# Patient Record
Sex: MALE | Race: WHITE | NOT HISPANIC OR LATINO | Employment: OTHER | URBAN - NONMETROPOLITAN AREA
[De-identification: names, ages, dates, MRNs, and addresses within clinical notes are randomized per-mention and may not be internally consistent; named-entity substitution may affect disease eponyms.]

---

## 2018-01-31 ENCOUNTER — HOSPITAL ENCOUNTER (OUTPATIENT)
Dept: GENERAL RADIOLOGY | Facility: HOSPITAL | Age: 58
Discharge: HOME | End: 2018-01-31
Admitting: NURSE PRACTITIONER

## 2018-01-31 ENCOUNTER — TRANSCRIBE ORDERS (OUTPATIENT)
Dept: ADMINISTRATIVE | Facility: HOSPITAL | Age: 58
End: 2018-01-31

## 2018-01-31 DIAGNOSIS — J40 BRONCHITIS: Primary | ICD-10-CM

## 2018-01-31 PROCEDURE — 71046 X-RAY EXAM CHEST 2 VIEWS: CPT

## 2019-02-26 ENCOUNTER — TRANSCRIBE ORDERS (OUTPATIENT)
Dept: ADMINISTRATIVE | Facility: HOSPITAL | Age: 59
End: 2019-02-26

## 2019-02-26 ENCOUNTER — HOSPITAL ENCOUNTER (OUTPATIENT)
Dept: GENERAL RADIOLOGY | Facility: HOSPITAL | Age: 59
Discharge: HOME | End: 2019-02-26

## 2019-02-26 ENCOUNTER — HOSPITAL ENCOUNTER (OUTPATIENT)
Dept: ULTRASOUND IMAGING | Facility: HOSPITAL | Age: 59
Discharge: HOME | End: 2019-02-26
Admitting: NURSE PRACTITIONER

## 2019-02-26 DIAGNOSIS — R20.2 TINGLING OF SKIN: ICD-10-CM

## 2019-02-26 DIAGNOSIS — R14.0 ABDOMINAL DISTENTION: ICD-10-CM

## 2019-02-26 DIAGNOSIS — R20.2 PARESTHESIA: ICD-10-CM

## 2019-02-26 DIAGNOSIS — R42 DIZZINESS AND GIDDINESS: ICD-10-CM

## 2019-02-26 DIAGNOSIS — R06.00 DYSPNEA, UNSPECIFIED TYPE: ICD-10-CM

## 2019-02-26 DIAGNOSIS — M79.605 LEFT LEG PAIN: ICD-10-CM

## 2019-02-26 DIAGNOSIS — R11.2 NAUSEA AND VOMITING, INTRACTABILITY OF VOMITING NOT SPECIFIED, UNSPECIFIED VOMITING TYPE: ICD-10-CM

## 2019-02-26 DIAGNOSIS — R42 DIZZINESS AND GIDDINESS: Primary | ICD-10-CM

## 2019-02-26 DIAGNOSIS — R20.2 PARESTHESIA: Primary | ICD-10-CM

## 2019-02-26 PROCEDURE — 71046 X-RAY EXAM CHEST 2 VIEWS: CPT

## 2019-02-26 PROCEDURE — 93971 EXTREMITY STUDY: CPT

## 2019-02-26 PROCEDURE — 74018 RADEX ABDOMEN 1 VIEW: CPT

## 2019-02-27 ENCOUNTER — HOSPITAL ENCOUNTER (OUTPATIENT)
Dept: CT IMAGING | Facility: HOSPITAL | Age: 59
Discharge: HOME | End: 2019-02-27
Admitting: NURSE PRACTITIONER

## 2019-02-27 ENCOUNTER — APPOINTMENT (OUTPATIENT)
Dept: CT IMAGING | Facility: HOSPITAL | Age: 59
End: 2019-02-27

## 2019-02-27 DIAGNOSIS — R20.2 TINGLING OF SKIN: ICD-10-CM

## 2019-02-27 DIAGNOSIS — R42 DIZZINESS AND GIDDINESS: ICD-10-CM

## 2019-02-27 PROCEDURE — 70450 CT HEAD/BRAIN W/O DYE: CPT

## 2019-03-13 ENCOUNTER — OFFICE VISIT (OUTPATIENT)
Dept: GASTROENTEROLOGY | Facility: CLINIC | Age: 59
End: 2019-03-13

## 2019-03-13 ENCOUNTER — LAB (OUTPATIENT)
Dept: LAB | Facility: HOSPITAL | Age: 59
End: 2019-03-13

## 2019-03-13 VITALS
SYSTOLIC BLOOD PRESSURE: 106 MMHG | HEART RATE: 78 BPM | WEIGHT: 208 LBS | TEMPERATURE: 96.2 F | HEIGHT: 71 IN | DIASTOLIC BLOOD PRESSURE: 74 MMHG | BODY MASS INDEX: 29.12 KG/M2 | OXYGEN SATURATION: 99 %

## 2019-03-13 DIAGNOSIS — B18.2 CHRONIC HEPATITIS C WITHOUT HEPATIC COMA (HCC): ICD-10-CM

## 2019-03-13 DIAGNOSIS — R79.89 ELEVATED LFTS: ICD-10-CM

## 2019-03-13 DIAGNOSIS — B18.2 CHRONIC HEPATITIS C WITHOUT HEPATIC COMA (HCC): Primary | ICD-10-CM

## 2019-03-13 LAB
ALBUMIN SERPL-MCNC: 4.4 G/DL (ref 3.5–5)
ALBUMIN/GLOB SERPL: 1.5 G/DL (ref 1.1–2.5)
ALP SERPL-CCNC: 67 U/L (ref 24–120)
ALT SERPL W P-5'-P-CCNC: 88 U/L (ref 0–54)
ANION GAP SERPL CALCULATED.3IONS-SCNC: 11 MMOL/L (ref 4–13)
AST SERPL-CCNC: 58 U/L (ref 7–45)
BASOPHILS # BLD AUTO: 0.02 10*3/MM3 (ref 0–0.2)
BASOPHILS NFR BLD AUTO: 0.4 % (ref 0–2)
BILIRUB SERPL-MCNC: 1.2 MG/DL (ref 0.1–1)
BUN BLD-MCNC: 14 MG/DL (ref 5–21)
BUN/CREAT SERPL: 13.2 (ref 7–25)
CALCIUM SPEC-SCNC: 9.2 MG/DL (ref 8.4–10.4)
CHLORIDE SERPL-SCNC: 104 MMOL/L (ref 98–110)
CO2 SERPL-SCNC: 27 MMOL/L (ref 24–31)
CREAT BLD-MCNC: 1.06 MG/DL (ref 0.5–1.4)
DEPRECATED RDW RBC AUTO: 42.2 FL (ref 40–54)
EOSINOPHIL # BLD AUTO: 0.06 10*3/MM3 (ref 0–0.7)
EOSINOPHIL NFR BLD AUTO: 1.2 % (ref 0–4)
ERYTHROCYTE [DISTWIDTH] IN BLOOD BY AUTOMATED COUNT: 13.1 % (ref 12–15)
ETHANOL UR QL: <0.01 %
GFR SERPL CREATININE-BSD FRML MDRD: 72 ML/MIN/1.73
GLOBULIN UR ELPH-MCNC: 3 GM/DL
GLUCOSE BLD-MCNC: 109 MG/DL (ref 70–100)
HBV CORE IGM SERPL QL IA: NEGATIVE
HBV SURFACE AB SER QL: <5
HBV SURFACE AB SER RIA-ACNC: ABNORMAL
HBV SURFACE AG SERPL QL IA: NEGATIVE
HCT VFR BLD AUTO: 44 % (ref 40–52)
HGB BLD-MCNC: 16.3 G/DL (ref 14–18)
HIV1+2 AB SER QL: NEGATIVE
IMM GRANULOCYTES # BLD AUTO: 0.01 10*3/MM3 (ref 0–0.05)
IMM GRANULOCYTES NFR BLD AUTO: 0.2 % (ref 0–5)
INR PPP: 1.07 (ref 0.91–1.09)
LYMPHOCYTES # BLD AUTO: 1.97 10*3/MM3 (ref 0.72–4.86)
LYMPHOCYTES NFR BLD AUTO: 39.6 % (ref 15–45)
MCH RBC QN AUTO: 32.8 PG (ref 28–32)
MCHC RBC AUTO-ENTMCNC: 37 G/DL (ref 33–36)
MCV RBC AUTO: 88.5 FL (ref 82–95)
MONOCYTES # BLD AUTO: 0.28 10*3/MM3 (ref 0.19–1.3)
MONOCYTES NFR BLD AUTO: 5.6 % (ref 4–12)
NEUTROPHILS # BLD AUTO: 2.63 10*3/MM3 (ref 1.87–8.4)
NEUTROPHILS NFR BLD AUTO: 53 % (ref 39–78)
NRBC BLD AUTO-RTO: 0 /100 WBC (ref 0–0)
PLATELET # BLD AUTO: 160 10*3/MM3 (ref 130–400)
PMV BLD AUTO: 10.6 FL (ref 6–12)
POTASSIUM BLD-SCNC: 3.9 MMOL/L (ref 3.5–5.3)
PROT SERPL-MCNC: 7.4 G/DL (ref 6.3–8.7)
PROTHROMBIN TIME: 14.2 SECONDS (ref 11.9–14.6)
RBC # BLD AUTO: 4.97 10*6/MM3 (ref 4.8–5.9)
SODIUM BLD-SCNC: 142 MMOL/L (ref 135–145)
WBC NRBC COR # BLD: 4.97 10*3/MM3 (ref 4.8–10.8)

## 2019-03-13 PROCEDURE — 85025 COMPLETE CBC W/AUTO DIFF WBC: CPT | Performed by: NURSE PRACTITIONER

## 2019-03-13 PROCEDURE — 87902 NFCT AGT GNTYP ALYS HEP C: CPT | Performed by: NURSE PRACTITIONER

## 2019-03-13 PROCEDURE — 85610 PROTHROMBIN TIME: CPT | Performed by: NURSE PRACTITIONER

## 2019-03-13 PROCEDURE — 99203 OFFICE O/P NEW LOW 30 MIN: CPT | Performed by: NURSE PRACTITIONER

## 2019-03-13 PROCEDURE — 86705 HEP B CORE ANTIBODY IGM: CPT | Performed by: NURSE PRACTITIONER

## 2019-03-13 PROCEDURE — G0432 EIA HIV-1/HIV-2 SCREEN: HCPCS | Performed by: NURSE PRACTITIONER

## 2019-03-13 PROCEDURE — 80053 COMPREHEN METABOLIC PANEL: CPT | Performed by: NURSE PRACTITIONER

## 2019-03-13 PROCEDURE — 87522 HEPATITIS C REVRS TRNSCRPJ: CPT | Performed by: NURSE PRACTITIONER

## 2019-03-13 PROCEDURE — 87340 HEPATITIS B SURFACE AG IA: CPT | Performed by: NURSE PRACTITIONER

## 2019-03-13 PROCEDURE — 80307 DRUG TEST PRSMV CHEM ANLYZR: CPT | Performed by: NURSE PRACTITIONER

## 2019-03-13 PROCEDURE — 36415 COLL VENOUS BLD VENIPUNCTURE: CPT | Performed by: NURSE PRACTITIONER

## 2019-03-13 PROCEDURE — 86708 HEPATITIS A ANTIBODY: CPT | Performed by: NURSE PRACTITIONER

## 2019-03-13 PROCEDURE — 86706 HEP B SURFACE ANTIBODY: CPT | Performed by: NURSE PRACTITIONER

## 2019-03-13 NOTE — PROGRESS NOTES
[facility] GASTROENTEROLOGY - OFFICE NOTE    3/13/2019    Conor Elizondo   1960    Primary Physician: Lux Leon MD    Chief Complaint   Patient presents with   • Hepatitis C   elevated lfts         HISTORY OF PRESENT ILLNESS    Conor Elizondo is a 58 y.o. male presents  with hepatitis c and elevated lft's.         Hepatitis C: He attempted therapy 2002.  He did not tolerate therapy secondary to significant mental problems/depression.  At that time he was treated with interferon.  Last office visit regarding hepatitis C was August 2005 and at that time Dr. Neal did discuss going over [facility] to see what they thought about treatment.  To see if they would have an experimental protocol perhaps with a different medication other than interferon.  The patient was not interested at that time.  He also stated that he did not want to go through interferon therapy ever again.    He has tattoo. No history of blood transfusions. No history of iv drugs. No illicit drugs currently. He denies etoh. No jaundice. No abdominal swelling/distention. No lower extremity swelling.no n/v. No fever. No abdominal pain. No sign of active gi bleeding.        He does have elevated lft's and states has been elevated for several years.     Unsure if has had vaccines for hep A or B.     Last colonoscopy was 2016 at [facility] in [place], states that it was okay.  He was told to repeat colonoscopy in 10 years.  He has no personal history of colon polyps or colon cancer.  There was documentation in the past records that his father had colon cancer.  He is telling me now that his father did not have colon cancer.  There is no family history of colon cancer or colon polyps.    Past Medical History:   Diagnosis Date   • Arthritis    • Elevated liver enzymes    • GERD (gastroesophageal reflux disease)    • Gout    • Infectious viral hepatitis     C   • Renal stones    • Seizures (CMS/HCC)    • Venous stasis        Past  Surgical History:   Procedure Laterality Date   • CHOLECYSTECTOMY     • COLONOSCOPY  07/26/2004    normal   • FOREARM SURGERY     • IMPLANTABLE CONTACT LENS IMPLANTATION     • OTHER SURGICAL HISTORY      left scapula repair   • SHOULDER SURGERY     • VASECTOMY         Outpatient Medications Marked as Taking for the 3/13/19 encounter (Office Visit) with Carmen Walker APRN   Medication Sig Dispense Refill   • clotrimazole-betamethasone (LOTRISONE) 1-0.05 % cream Apply  topically to the appropriate area as directed As Needed.     • ibuprofen (ADVIL,MOTRIN) 200 MG tablet Take 200 mg by mouth Every 6 (Six) Hours As Needed for Mild Pain .         Allergies   Allergen Reactions   • Levaquin [Levofloxacin] Other (See Comments)     seizures       Social History     Socioeconomic History   • Marital status:      Spouse name: Not on file   • Number of children: Not on file   • Years of education: Not on file   • Highest education level: Not on file   Social Needs   • Financial resource strain: Not on file   • Food insecurity - worry: Not on file   • Food insecurity - inability: Not on file   • Transportation needs - medical: Not on file   • Transportation needs - non-medical: Not on file   Occupational History   • Not on file   Tobacco Use   • Smoking status: Never Smoker   • Smokeless tobacco: Never Used   Substance and Sexual Activity   • Alcohol use: No     Frequency: Never   • Drug use: No   • Sexual activity: Defer   Other Topics Concern   • Not on file   Social History Narrative   • Not on file       Family History   Problem Relation Age of Onset   • Colon cancer Neg Hx    • Colon polyps Neg Hx        Review of Systems   Constitutional: Negative for appetite change, chills, fatigue, fever and unexpected weight change.   HENT: Negative for sore throat and trouble swallowing.    Eyes: Negative for visual disturbance.   Respiratory: Negative for cough, chest tightness, shortness of breath and wheezing.   "  Cardiovascular: Negative for chest pain and palpitations.   Gastrointestinal: Negative for abdominal distention, abdominal pain, anal bleeding, blood in stool, constipation, diarrhea, nausea, rectal pain and vomiting.        As mentioned in hpi   Genitourinary: Negative for difficulty urinating and hematuria.   Musculoskeletal: Negative for arthralgias and back pain.   Skin: Negative for color change and rash.   Neurological: Negative for dizziness, seizures, syncope, light-headedness and headaches.   Hematological: Negative for adenopathy.   Psychiatric/Behavioral: Negative for confusion. The patient is not nervous/anxious.         Vitals:    03/13/19 1446   BP: 106/74   Pulse: 78   Temp: 96.2 °F (35.7 °C)   SpO2: 99%   Weight: 94.3 kg (208 lb)   Height: 180.3 cm (71\")      Body mass index is 29.01 kg/m².    Physical Exam   Constitutional: He appears well-developed and well-nourished. No distress.   HENT:   Head: Normocephalic and atraumatic.   Eyes: EOM are normal. No scleral icterus.   Neck: Neck supple. No JVD present.   Cardiovascular: Normal rate, regular rhythm and normal heart sounds.   Pulmonary/Chest: Effort normal and breath sounds normal.   Abdominal: Soft. Bowel sounds are normal. He exhibits no distension. There is no tenderness.   Musculoskeletal: Normal range of motion. He exhibits no deformity.   Neurological: He is alert.   Skin: Skin is warm and dry. No rash noted.   Psychiatric: He has a normal mood and affect. His behavior is normal.   Vitals reviewed.      No results found for this or any previous visit.        ASSESSMENT AND PLAN    Assessment/Plan     Conor was seen today for hepatitis c.    Diagnoses and all orders for this visit:    Chronic hepatitis C without hepatic coma (CMS/HCC)  -     Drug Screen (10), Serum; Future  -     Ethanol; Future  -     Hepatitis A Antibody, Total; Future  -     Hepatitis B Core Antibody, IgM; Future  -     Hepatitis B Surface Antibody; Future  -     " Hepatitis B Surface Antigen; Future  -     Hepatitis C Genotype  -     HIV-1 & HIV-2 Antibodies  -     HCV RT-PCR,Quant(Non-Graph)  -     US Liver; Future  -     US Elastography Parenchyma; Future  -     CBC & Differential  -     Comprehensive Metabolic Panel  -     Protime-INR    Elevated LFTs    Other orders  -     Cancel: CBC & Differential  -     Cancel: Comprehensive Metabolic Panel    In regards to chronic hepatitis C, plan to repeat labs as above.  We briefly discussed treatment.  We will discuss treatment more in depth once we have the results of the lab test.  I had a long discussion about hepatitis c.      We discussed the risks assoc such as hepatoma, cirrhosis and what that meant.  Discussed risk or premature death. Discussed tx options and estimated cure rates from 95-99%. Went thru all the side effects of meds (such as fatigue, HA, nausea, diarrhea, insomnia etc.) . We discussed the cost of treatment and what insurance companies may pay for and not.  The significance of existing fibrosis was discussed.  We discussed how to take and importance of compliance with meds, labs, and follow up visits as directed.     In regards to elevated LFTs, most likely secondary to hepatitis C.  I do not have any labs for review.  We will recheck labs today and if liver function test elevated then we will also obtain liver serologies to make sure no other reason for elevated liver function test other than hepatitis C.  Liver imaging to be obtained as well.         Body mass index is 29.01 kg/m².    Patient's Body mass index is 29.01 kg/m². BMI is above normal parameters. Recommendations include: no follow up , recommend weight loss. .       Return in about 6 weeks (around 4/24/2019).        PATRICIA Macdonald Dragon/transcription disclaimer:  Much of this encounter note is electronic transcription/translation of spoken language to printed text.  The electronic translation of spoken language may be erroneous, or  at times, nonsensical words or phrases may be inadvertently transcribed.  Although I have reviewed the note for such errors, some may still exist.

## 2019-03-14 ENCOUNTER — TELEPHONE (OUTPATIENT)
Dept: GASTROENTEROLOGY | Facility: CLINIC | Age: 59
End: 2019-03-14

## 2019-03-14 DIAGNOSIS — R79.89 ELEVATED LFTS: Primary | ICD-10-CM

## 2019-03-14 LAB — HAV AB SER QL IA: NEGATIVE

## 2019-03-14 NOTE — TELEPHONE ENCOUNTER
Pt verbalized understanding and will get labs in the morning and contact Dr Kate office. He will contact me if needed.

## 2019-03-14 NOTE — TELEPHONE ENCOUNTER
Please let patient know that his lft's are elevated , would recommend additional lab test to make sure no other reason for liver function test to be elevated other than hep c.   I will put order in for the additional labs , thank you     Also let him know that his labs indicate that he is not immune to hep a or b so I would recommend he contact his pcp to arrange getting vaccines ( recommend to be done before hep c treatment ).

## 2019-03-15 ENCOUNTER — LAB (OUTPATIENT)
Dept: LAB | Facility: HOSPITAL | Age: 59
End: 2019-03-15

## 2019-03-15 DIAGNOSIS — R79.89 ELEVATED LFTS: ICD-10-CM

## 2019-03-15 LAB
AMPHETAMINES SERPL QL SCN: NEGATIVE NG/ML
BARBITURATES SERPLBLD QL: NEGATIVE UG/ML
BENZODIAZ SERPL QL: NEGATIVE NG/ML
BZE BLD QL SCN: NEGATIVE NG/ML
FERRITIN SERPL-MCNC: 165 NG/ML (ref 17.9–464)
HCV RNA SERPL NAA+PROBE-ACNC: NORMAL IU/ML
HCV RNA SERPL NAA+PROBE-LOG IU: 5.8 LOG10 IU/ML
IRON 24H UR-MRATE: 78 MCG/DL (ref 42–180)
IRON SATN MFR SERPL: 22 % (ref 20–45)
METHADONE UR QL: NEGATIVE NG/ML
OPIATES SERPL QL SCN: NEGATIVE NG/ML
OXYCODONE SERPL-MCNC: NEGATIVE NG/ML
PCP SPEC-MCNC: NEGATIVE NG/ML
PROPOXYPH SPEC QL: NEGATIVE NG/ML
TEST INFORMATION: NORMAL
THC SERPLBLD CFM-MCNC: NEGATIVE NG/ML
TIBC SERPL-MCNC: 351 MCG/DL (ref 225–420)

## 2019-03-15 PROCEDURE — 83540 ASSAY OF IRON: CPT | Performed by: NURSE PRACTITIONER

## 2019-03-15 PROCEDURE — 82728 ASSAY OF FERRITIN: CPT | Performed by: NURSE PRACTITIONER

## 2019-03-15 PROCEDURE — 83516 IMMUNOASSAY NONANTIBODY: CPT | Performed by: NURSE PRACTITIONER

## 2019-03-15 PROCEDURE — 86038 ANTINUCLEAR ANTIBODIES: CPT | Performed by: NURSE PRACTITIONER

## 2019-03-15 PROCEDURE — 82103 ALPHA-1-ANTITRYPSIN TOTAL: CPT | Performed by: NURSE PRACTITIONER

## 2019-03-15 PROCEDURE — 36415 COLL VENOUS BLD VENIPUNCTURE: CPT

## 2019-03-15 PROCEDURE — 82390 ASSAY OF CERULOPLASMIN: CPT | Performed by: NURSE PRACTITIONER

## 2019-03-15 PROCEDURE — 83550 IRON BINDING TEST: CPT | Performed by: NURSE PRACTITIONER

## 2019-03-16 LAB
A1AT SERPL-MCNC: 129 MG/DL (ref 90–200)
ACTIN IGG SERPL-ACNC: 17 UNITS (ref 0–19)
CERULOPLASMIN SERPL-MCNC: 18.7 MG/DL (ref 16–31)
DEPRECATED MITOCHONDRIA M2 IGG SER-ACNC: <20 UNITS (ref 0–20)

## 2019-03-17 LAB
HCV GENTYP SERPL NAA+PROBE: NORMAL
Lab: NORMAL

## 2019-03-18 LAB
ANA SER QL IA: POSITIVE
ANA SPECKLED TITR SER: ABNORMAL {TITER}
Lab: ABNORMAL

## 2019-03-19 ENCOUNTER — TELEPHONE (OUTPATIENT)
Dept: GASTROENTEROLOGY | Facility: CLINIC | Age: 59
End: 2019-03-19

## 2019-03-20 ENCOUNTER — HOSPITAL ENCOUNTER (OUTPATIENT)
Dept: ULTRASOUND IMAGING | Facility: HOSPITAL | Age: 59
Discharge: HOME | End: 2019-03-20
Admitting: NURSE PRACTITIONER

## 2019-03-20 ENCOUNTER — HOSPITAL ENCOUNTER (OUTPATIENT)
Dept: ULTRASOUND IMAGING | Facility: HOSPITAL | Age: 59
Discharge: HOME | End: 2019-03-20

## 2019-03-20 DIAGNOSIS — B18.2 CHRONIC HEPATITIS C WITHOUT HEPATIC COMA (HCC): ICD-10-CM

## 2019-03-20 PROCEDURE — 76705 ECHO EXAM OF ABDOMEN: CPT

## 2019-03-20 PROCEDURE — 76981 USE PARENCHYMA: CPT

## 2019-03-22 ENCOUNTER — TELEPHONE (OUTPATIENT)
Dept: GASTROENTEROLOGY | Facility: CLINIC | Age: 59
End: 2019-03-22

## 2019-03-22 NOTE — TELEPHONE ENCOUNTER
Please let patient know that ultrasound shows fibrosis F 1 so that means there is minimal scarring of the liver noted.  Let him know we are going to be submitting info to specialty pharmacy to see which med his insurance will cover if any. He is to keep f/u appointment here 4/24/24 to regroup/discuss labs and treatment , thank you

## 2019-04-04 ENCOUNTER — TELEPHONE (OUTPATIENT)
Dept: GASTROENTEROLOGY | Facility: CLINIC | Age: 59
End: 2019-04-04

## 2019-04-04 NOTE — TELEPHONE ENCOUNTER
Jim, let us go ahead and submit all his information labs and office notes to [facility] with [facility] for hepatitis C treatment.  Thanks    Did talk to Mr. Elizondo today and he is ready to go ahead and have his information submitted for treatment for hepatitis C.  States that he has already started his hepatitis A/B vaccines.

## 2019-04-16 ENCOUNTER — TELEPHONE (OUTPATIENT)
Dept: GASTROENTEROLOGY | Facility: CLINIC | Age: 59
End: 2019-04-16

## 2019-04-16 NOTE — TELEPHONE ENCOUNTER
I called and spoke with him. He is in process of getting harvoni.  We discussed side effects of Harvoni.  We discussed not to take any prescription or over-the-counter medications or herbs without consulting with our office first.  Also told him not to take Amena's wort while on this medication.  Also discussed no alcohol.  He verbalizes understanding to call us with start date of Harvoni.

## 2019-04-19 ENCOUNTER — TELEPHONE (OUTPATIENT)
Dept: GASTROENTEROLOGY | Facility: CLINIC | Age: 59
End: 2019-04-19

## 2019-04-25 ENCOUNTER — TELEPHONE (OUTPATIENT)
Dept: GASTROENTEROLOGY | Facility: CLINIC | Age: 59
End: 2019-04-25

## 2019-04-25 DIAGNOSIS — B18.2 CHRONIC HEPATITIS C WITHOUT HEPATIC COMA (HCC): Primary | ICD-10-CM

## 2019-04-25 NOTE — TELEPHONE ENCOUNTER
Started harvoni 4-24-19, recommend recheck hcv rna quant the week of 6-24-19 with f/u appointment with me. I will put labs in , can you make sure he gets appointment made for week of 6-24-19. Thank you

## 2019-07-08 ENCOUNTER — LAB (OUTPATIENT)
Dept: LAB | Facility: HOSPITAL | Age: 59
End: 2019-07-08

## 2019-07-08 DIAGNOSIS — B18.2 CHRONIC HEPATITIS C WITHOUT HEPATIC COMA (HCC): ICD-10-CM

## 2019-07-08 PROCEDURE — 87522 HEPATITIS C REVRS TRNSCRPJ: CPT | Performed by: NURSE PRACTITIONER

## 2019-07-08 PROCEDURE — 36415 COLL VENOUS BLD VENIPUNCTURE: CPT

## 2019-07-09 ENCOUNTER — OFFICE VISIT (OUTPATIENT)
Dept: GASTROENTEROLOGY | Facility: CLINIC | Age: 59
End: 2019-07-09

## 2019-07-09 VITALS
BODY MASS INDEX: 28 KG/M2 | OXYGEN SATURATION: 99 % | WEIGHT: 200 LBS | HEIGHT: 71 IN | TEMPERATURE: 95.8 F | HEART RATE: 61 BPM | DIASTOLIC BLOOD PRESSURE: 78 MMHG | SYSTOLIC BLOOD PRESSURE: 120 MMHG

## 2019-07-09 DIAGNOSIS — R79.89 ELEVATED LIVER FUNCTION TESTS: ICD-10-CM

## 2019-07-09 DIAGNOSIS — B18.2 CHRONIC HEPATITIS C WITHOUT HEPATIC COMA (HCC): Primary | ICD-10-CM

## 2019-07-09 LAB
HCV RNA SERPL NAA+PROBE-ACNC: NORMAL IU/ML
TEST INFORMATION: NORMAL

## 2019-07-09 PROCEDURE — 99213 OFFICE O/P EST LOW 20 MIN: CPT | Performed by: NURSE PRACTITIONER

## 2019-07-09 NOTE — PROGRESS NOTES
[facility] GASTROENTEROLOGY - OFFICE NOTE    7/9/2019    Conor Elizondo   1960    Primary Physician: Lux Leon MD    Chief Complaint   Patient presents with   • Hepatitis C         HISTORY OF PRESENT ILLNESS:    Conor Elizondo is a 58 y.o. male presents for follow-up hepatitis C, genotype 1a.  He started Harvoni treatment on April 24, 2019.   Completed 8 weeks of Harvoni treatment.  He finished treatment around 6-14-19.   He had repeat HCV quantitative level last week and is still pending.  Had some lightheadedness while on treatment.  States that he feels really good right now.  He will be out of town again from July 18 to around August 9.  No n/v. No fatigue.  No abdominal pain.     He did get started on hepatitis A and B vaccines.  He is getting those at [facility] pharmacy.    Prior to hepatitis C treatment he had ultrasound elastography noting a METAVIR score of F1.  There were no liver lesions noted.   LFTs were elevated and liver serologies were all unremarkable.  ===================================================================    Ov  3-13-19   Hepatitis C: He attempted therapy 2002.  He did not tolerate therapy secondary to significant mental problems/depression.  At that time he was treated with interferon.  Last office visit regarding hepatitis C was August 2005 and at that time Dr. Neal did discuss [facility] to see what they thought about treatment.  To see if they would have an experimental protocol perhaps with a different medication other than interferon.  The patient was not interested at that time.  He also stated that he did not want to go through interferon therapy ever again.     He has tattoo. No history of blood transfusions. No history of iv drugs. No illicit drugs currently. He denies etoh. No jaundice. No abdominal swelling/distention. No lower extremity swelling.no n/v. No fever. No abdominal pain. No sign of active gi bleeding.          He does have  elevated lft's and states has been elevated for several years.      Unsure if has had vaccines for hep A or B.      Last colonoscopy was 2016 at [facility] in [place], states that it was okay.  He was told to repeat colonoscopy in 10 years.  He has no personal history of colon polyps or colon cancer.  There was documentation in the past records that his father had colon cancer.  He is telling me now that his father did not have colon cancer.  There is no family history of colon cancer or colon polyps.             Past Medical History:   Diagnosis Date   • Arthritis    • Elevated liver enzymes    • GERD (gastroesophageal reflux disease)    • Gout    • Infectious viral hepatitis     C   • Renal stones    • Seizures (CMS/HCC)    • Venous stasis        Past Surgical History:   Procedure Laterality Date   • CHOLECYSTECTOMY     • COLONOSCOPY  07/26/2004    normal   • FOREARM SURGERY     • IMPLANTABLE CONTACT LENS IMPLANTATION     • OTHER SURGICAL HISTORY      left scapula repair   • SHOULDER SURGERY     • VASECTOMY         Outpatient Medications Marked as Taking for the 7/9/19 encounter (Office Visit) with Carmen Walker APRN   Medication Sig Dispense Refill   • clotrimazole-betamethasone (LOTRISONE) 1-0.05 % cream Apply  topically to the appropriate area as directed As Needed.     • ibuprofen (ADVIL,MOTRIN) 200 MG tablet Take 200 mg by mouth Every 6 (Six) Hours As Needed for Mild Pain .         Allergies   Allergen Reactions   • Levaquin [Levofloxacin] Other (See Comments)     seizures       Social History     Socioeconomic History   • Marital status:      Spouse name: Not on file   • Number of children: Not on file   • Years of education: Not on file   • Highest education level: Not on file   Tobacco Use   • Smoking status: Never Smoker   • Smokeless tobacco: Never Used   Substance and Sexual Activity   • Alcohol use: No     Frequency: Never   • Drug use: No   • Sexual activity: Defer       Family History  "  Problem Relation Age of Onset   • Colon cancer Neg Hx    • Colon polyps Neg Hx        Review of Systems   Constitutional: Negative for chills, fever and unexpected weight change.   Respiratory: Negative for cough, shortness of breath and wheezing.    Cardiovascular: Negative for chest pain and palpitations.   Gastrointestinal: Negative for abdominal distention, abdominal pain, anal bleeding, blood in stool, constipation, diarrhea, nausea and vomiting.        Vitals:    07/09/19 1434   BP: 120/78   Pulse: 61   Temp: 95.8 °F (35.4 °C)   SpO2: 99%   Weight: 90.7 kg (200 lb)   Height: 180.3 cm (71\")      Body mass index is 27.89 kg/m².    Physical Exam   Constitutional: He appears well-developed and well-nourished. No distress.   Cardiovascular: Normal rate, regular rhythm and normal heart sounds.   Pulmonary/Chest: Effort normal and breath sounds normal.   Abdominal: Soft. Bowel sounds are normal. He exhibits no distension. There is no tenderness.   Musculoskeletal: He exhibits no edema.   Neurological: He is alert.   Skin: Skin is warm and dry.   Psychiatric: He has a normal mood and affect. His behavior is normal.   Vitals reviewed.      Results for orders placed or performed in visit on 03/15/19   Alpha - 1 - Antitrypsin   Result Value Ref Range    A-1 Antitrypsin 129 90 - 200 mg/dL   Anti-Smooth Muscle Antibody Titer   Result Value Ref Range    Smooth Muscle Ab 17 0 - 19 Units   Ferritin   Result Value Ref Range    Ferritin 165.00 17.90 - 464.00 ng/mL   Ceruloplasmin   Result Value Ref Range    Ceruloplasmin 18.7 16.0 - 31.0 mg/dL   Iron Profile   Result Value Ref Range    Iron 78 42 - 180 mcg/dL    TIBC 351 225 - 420 mcg/dL    Iron Saturation 22 20 - 45 %   Mitochondrial Antibodies, M2   Result Value Ref Range    Mitochondrial Ab <20.0 0.0 - 20.0 Units   Nuclear Antigen Antibody, IFA   Result Value Ref Range    LISY Positive (A)    ESTIVEN Staining Patterns   Result Value Ref Range    Speckled Pattern 1:160 (H)     " Note: (Reference) Comment            ASSESSMENT AND PLAN    Assessment/Plan     Conor was seen today for hepatitis c.    Diagnoses and all orders for this visit:    Chronic hepatitis C without hepatic coma (CMS/HCC)  Comments:  genotype 1 a   Orders:  -     Comprehensive Metabolic Panel; Future  -     HCV RT-PCR,Quant(Non-Graph); Future    Elevated liver function tests  -     Comprehensive Metabolic Panel; Future  -     HCV RT-PCR,Quant(Non-Graph); Future    He completed Harvoni treatment around June 14, 2019.  States that he feels really good.  He  has had no further fatigue.  He did get his lab work done yesterday which was an HCV quantitative level.  Results are pending.  We will repeat labs around August 14 which will be 2 months post treatment.  I did stress to him that is very important to have the lab test done.  He verbalized understanding.  I will contact him with results.  Follow-up here as needed unless treatment was unsuccessful then can make follow-up appointment to discuss options.           Body mass index is 27.89 kg/m².    Patient's Body mass index is 27.89 kg/m². BMI is above normal parameters. Recommendations include: No follow-up, recommend weight loss.         PATRICIA Macdonald    EMR Dragon/transcription disclaimer:  Much of this encounter note is electronic transcription/translation of spoken language to printed text.  The electronic translation of spoken language may be erroneous, or at times, nonsensical words or phrases may be inadvertently transcribed.  Although I have reviewed the note for such errors, some may still exist.

## 2019-07-12 ENCOUNTER — TELEPHONE (OUTPATIENT)
Dept: GASTROENTEROLOGY | Facility: CLINIC | Age: 59
End: 2019-07-12

## 2019-07-12 NOTE — TELEPHONE ENCOUNTER
Let him know HCV is not detected at this point. This is good news.  However as we discussed in the office he will need to have quantitative HCV rechecked again around August 14 which is more important. Thank you

## 2019-08-19 ENCOUNTER — LAB (OUTPATIENT)
Dept: LAB | Facility: HOSPITAL | Age: 59
End: 2019-08-19

## 2019-08-19 DIAGNOSIS — R79.89 ELEVATED LIVER FUNCTION TESTS: ICD-10-CM

## 2019-08-19 DIAGNOSIS — B18.2 CHRONIC HEPATITIS C WITHOUT HEPATIC COMA (HCC): ICD-10-CM

## 2019-08-19 LAB
ALBUMIN SERPL-MCNC: 4.2 G/DL (ref 3.5–5)
ALBUMIN/GLOB SERPL: 1.3 G/DL (ref 1.1–2.5)
ALP SERPL-CCNC: 66 U/L (ref 24–120)
ALT SERPL W P-5'-P-CCNC: 17 U/L (ref 0–54)
ANION GAP SERPL CALCULATED.3IONS-SCNC: 8 MMOL/L (ref 4–13)
AST SERPL-CCNC: 26 U/L (ref 7–45)
BILIRUB SERPL-MCNC: 1.4 MG/DL (ref 0.1–1)
BUN BLD-MCNC: 17 MG/DL (ref 5–21)
BUN/CREAT SERPL: 15.3 (ref 7–25)
CALCIUM SPEC-SCNC: 9.3 MG/DL (ref 8.4–10.4)
CHLORIDE SERPL-SCNC: 105 MMOL/L (ref 98–110)
CO2 SERPL-SCNC: 28 MMOL/L (ref 24–31)
CREAT BLD-MCNC: 1.11 MG/DL (ref 0.5–1.4)
GFR SERPL CREATININE-BSD FRML MDRD: 68 ML/MIN/1.73
GLOBULIN UR ELPH-MCNC: 3.3 GM/DL
GLUCOSE BLD-MCNC: 117 MG/DL (ref 70–100)
POTASSIUM BLD-SCNC: 3.9 MMOL/L (ref 3.5–5.3)
PROT SERPL-MCNC: 7.5 G/DL (ref 6.3–8.7)
SODIUM BLD-SCNC: 141 MMOL/L (ref 135–145)

## 2019-08-19 PROCEDURE — 36415 COLL VENOUS BLD VENIPUNCTURE: CPT

## 2019-08-19 PROCEDURE — 80053 COMPREHEN METABOLIC PANEL: CPT | Performed by: NURSE PRACTITIONER

## 2019-08-19 PROCEDURE — 87522 HEPATITIS C REVRS TRNSCRPJ: CPT | Performed by: NURSE PRACTITIONER

## 2019-08-20 LAB
HCV RNA SERPL NAA+PROBE-ACNC: NORMAL IU/ML
TEST INFORMATION: NORMAL

## 2019-08-21 ENCOUNTER — TELEPHONE (OUTPATIENT)
Dept: GASTROENTEROLOGY | Facility: CLINIC | Age: 59
End: 2019-08-21

## 2019-08-21 NOTE — TELEPHONE ENCOUNTER
Let him know that hcv not detected , so good news. He should have ov 1 mo from now for follow up. I will send message to girls up front. Thank you

## 2019-10-03 ENCOUNTER — LAB (OUTPATIENT)
Dept: LAB | Facility: HOSPITAL | Age: 59
End: 2019-10-03

## 2019-10-03 ENCOUNTER — OFFICE VISIT (OUTPATIENT)
Dept: GASTROENTEROLOGY | Facility: CLINIC | Age: 59
End: 2019-10-03

## 2019-10-03 VITALS
SYSTOLIC BLOOD PRESSURE: 112 MMHG | HEIGHT: 71 IN | HEART RATE: 65 BPM | WEIGHT: 204 LBS | DIASTOLIC BLOOD PRESSURE: 80 MMHG | OXYGEN SATURATION: 99 % | TEMPERATURE: 96.5 F | BODY MASS INDEX: 28.56 KG/M2

## 2019-10-03 DIAGNOSIS — R79.89 ABNORMAL LFTS: ICD-10-CM

## 2019-10-03 DIAGNOSIS — Z86.19 HEPATITIS C VIRUS INFECTION CURED AFTER ANTIVIRAL DRUG THERAPY: Primary | ICD-10-CM

## 2019-10-03 PROBLEM — B18.2 CHRONIC HEPATITIS C WITHOUT HEPATIC COMA (HCC): Status: RESOLVED | Noted: 2019-07-09 | Resolved: 2019-10-03

## 2019-10-03 PROCEDURE — 36415 COLL VENOUS BLD VENIPUNCTURE: CPT

## 2019-10-03 PROCEDURE — 99213 OFFICE O/P EST LOW 20 MIN: CPT | Performed by: INTERNAL MEDICINE

## 2019-10-03 PROCEDURE — 87522 HEPATITIS C REVRS TRNSCRPJ: CPT | Performed by: INTERNAL MEDICINE

## 2019-10-03 PROCEDURE — 82248 BILIRUBIN DIRECT: CPT | Performed by: INTERNAL MEDICINE

## 2019-10-03 PROCEDURE — 82247 BILIRUBIN TOTAL: CPT | Performed by: INTERNAL MEDICINE

## 2019-10-03 NOTE — PROGRESS NOTES
[facility] Gastroenterology    Chief Complaint   Patient presents with   • Elevated Hepatic Enzymes       Subjective     HPI    Conor Elizondo is a 58 y.o. male who presents with a chief complaint of abnormal LFTs.    He comes in for follow-up of an abnormal LFT.  His bilirubin is a little bit elevated at 1.4.  Previously is 1.2.  His transaminases have normalized.  They were elevated prior to start hepatitis C therapy.  He completed of 8 weeks of Harvoni on June 14.  He had a negative quantitative level in July and August.  See copies of prior H PI 's below.  He feels well.  He has no complaints.  He does tell me that he was fasting prior to his last labs when he had elevated bilirubin 1.4.  Dates he probably fasting for close to 1 day.  He is never been jaundiced never told he had Gilbert's disease.  He has no complaints.  He is happy that he he has had a positive response to Harvoni treatment.      ============================================================  July 9, 2019 HISTORY OF PRESENT ILLNESS:     Conor Elizondo is a 58 y.o. male presents for follow-up hepatitis C, genotype 1a.  He started Harvoni treatment on April 24, 2019.   Completed 8 weeks of Harvoni treatment.  He finished treatment around 6-14-19.   He had repeat HCV quantitative level last week and is still pending.  Had some lightheadedness while on treatment.  States that he feels really good right now.  He will be out of town again from July 18 to around August 9.  No n/v. No fatigue.  No abdominal pain.      He did get started on hepatitis A and B vaccines.  He is getting those at [facility] pharmacy.     Prior to hepatitis C treatment he had ultrasound elastography noting a METAVIR score of F1.  There were no liver lesions noted.   LFTs were elevated and liver serologies were all unremarkable.  ===================================================================     Ov  3-13-19   Hepatitis C: He attempted therapy 2002.  He did not tolerate  therapy secondary to significant mental problems/depression.  At that time he was treated with interferon.  Last office visit regarding hepatitis C was August 2005 and at that time Dr. Neal did discuss going over [facility] to see what they thought about treatment.  To see if they would have an experimental protocol perhaps with a different medication other than interferon.  The patient was not interested at that time.  He also stated that he did not want to go through interferon therapy ever again.     He has tattoo. No history of blood transfusions. No history of iv drugs. No illicit drugs currently. He denies etoh. No jaundice. No abdominal swelling/distention. No lower extremity swelling.no n/v. No fever. No abdominal pain. No sign of active gi bleeding.          He does have elevated lft's and states has been elevated for several years.      Unsure if has had vaccines for hep A or B.      Last colonoscopy was 2016 at [facility] in [place], states that it was okay.  He was told to repeat colonoscopy in 10 years.  He has no personal history of colon polyps or colon cancer.  There was documentation in the past records that his father had colon cancer.  He is telling me now that his father did not have colon cancer.  There is no family history of colon cancer or colon polyps.          Past Medical History:   Diagnosis Date   • Arthritis    • Elevated liver enzymes    • GERD (gastroesophageal reflux disease)    • Gout    • Infectious viral hepatitis     C   • Renal stones    • Seizures (CMS/HCC)    • Venous stasis        Past Surgical History:   Procedure Laterality Date   • CHOLECYSTECTOMY     • COLONOSCOPY  07/26/2004    normal   • FOREARM SURGERY     • IMPLANTABLE CONTACT LENS IMPLANTATION     • OTHER SURGICAL HISTORY      left scapula repair   • SHOULDER SURGERY     • VASECTOMY           Current Outpatient Medications:   •  clotrimazole-betamethasone (LOTRISONE) 1-0.05 % cream, Apply  topically to the  appropriate area as directed As Needed., Disp: , Rfl:   •  ibuprofen (ADVIL,MOTRIN) 200 MG tablet, Take 200 mg by mouth Every 6 (Six) Hours As Needed for Mild Pain ., Disp: , Rfl:     Allergies   Allergen Reactions   • Levaquin [Levofloxacin] Other (See Comments)     seizures       Social History     Socioeconomic History   • Marital status:      Spouse name: Not on file   • Number of children: Not on file   • Years of education: Not on file   • Highest education level: Not on file   Tobacco Use   • Smoking status: Never Smoker   • Smokeless tobacco: Never Used   Substance and Sexual Activity   • Alcohol use: No     Frequency: Never   • Drug use: No   • Sexual activity: Defer       Family History   Problem Relation Age of Onset   • Colon cancer Neg Hx    • Colon polyps Neg Hx        Review of Systems  General no fever chills or sweats weight stable  Gastrointestinal: Not present-abdominal pain, constipation, diarrhea, dysphagia, hematemesis, melena, odynophagia, nausea, vomiting, pyrosis, regurgitation, hematochezia,    Objective     Vitals:    10/03/19 1449   BP: 112/80   Pulse: 65   Temp: 96.5 °F (35.8 °C)   SpO2: 99%       Physical Exam  No acute distress. Vital signs as documented. Skin warm and dry and without overt rashes. EOMI, sclera anicteric.  Neck without JVD or masses. Lungs clear to auscultation bilaterally, no rales. Heart exam notable for regular rhythm, normal sounds and absence of loud murmurs, rubs or gallops. Abdomen is soft, nontender, non distended, normal bowel sounds and without evidence of organomegaly, masses, or abdominal aortic enlargement. Extremities nonedematous, no cyanosis. Neuro alert, moves extremities.        Assessment/Plan   Problem List Items Addressed This Visit        Other    Hepatitis C virus infection cured after antiviral drug therapy - Primary      Other Visit Diagnoses     Abnormal LFTs        Relevant Orders    Bilirubin, Total & Direct    HCV  RT-PCR,Quant(Non-Graph)            Regards to his hepatitis it appears that he has had a sustained response.  He had a quantitative level checked 2 months after therapy.  I do recommend we check it now to make sure he has a a sustained response.  If this is negative no further investigation is warranted.  He is in agreement.    He does tell me he has 1 round to go on his hepatitis AMB vaccinations and I encouraged him to finish out vaccination.    In regards to the elevated bilirubin, I suspect this is secondary to Rosa Maria bears.  His transaminases are normal.  It was elevated after fast.  I recommend we go ahead and check a total bilirubin and direct bilirubin today.  He states he did eat.  We will get labs on his way out.  If they are unremarkable then no further investigation.    If the labs come back unremarkable we will see him back in the office as needed.  He is in agreement with this approach.    Continue ongoing management by primary care provider and other specialists.     Patient's Body mass index is 28.45 kg/m². BMI is within normal parameters. No follow-up required..        EMR Dragon/transcription disclaimer:  Much of this encounter note is electronic transcription/translation of spoken language to printed text.  The electronic translation of spoken language may be erroneous, or at times, nonsensical words or phrases may be inadvertently transcribed.  Although I have reviewed the note for such errors, some may still exist.    Pete Neal MD  3:19 PM  10/03/19

## 2019-10-04 LAB
BILIRUB CONJ SERPL-MCNC: 0.2 MG/DL (ref 0.2–0.3)
BILIRUB INDIRECT SERPL-MCNC: 0.5 MG/DL
BILIRUB SERPL-MCNC: 0.7 MG/DL (ref 0.2–1.2)
HCV RNA SERPL NAA+PROBE-ACNC: NORMAL IU/ML
TEST INFORMATION: NORMAL

## 2019-10-25 ENCOUNTER — TRANSCRIBE ORDERS (OUTPATIENT)
Dept: ADMINISTRATIVE | Facility: HOSPITAL | Age: 59
End: 2019-10-25

## 2019-10-25 DIAGNOSIS — R07.9 CHEST PAIN, UNSPECIFIED TYPE: Primary | ICD-10-CM

## 2019-10-28 ENCOUNTER — TRANSCRIBE ORDERS (OUTPATIENT)
Dept: ADMINISTRATIVE | Facility: HOSPITAL | Age: 59
End: 2019-10-28

## 2019-10-28 DIAGNOSIS — R07.9 CHEST PAIN, UNSPECIFIED TYPE: Primary | ICD-10-CM

## 2019-10-29 ENCOUNTER — APPOINTMENT (OUTPATIENT)
Dept: CARDIOLOGY | Facility: HOSPITAL | Age: 59
End: 2019-10-29

## 2019-10-29 ENCOUNTER — HOSPITAL ENCOUNTER (OUTPATIENT)
Dept: CARDIOLOGY | Facility: HOSPITAL | Age: 59
Discharge: HOME | End: 2019-10-29
Admitting: INTERNAL MEDICINE

## 2019-10-29 VITALS
BODY MASS INDEX: 28.55 KG/M2 | HEART RATE: 56 BPM | HEIGHT: 71 IN | DIASTOLIC BLOOD PRESSURE: 49 MMHG | SYSTOLIC BLOOD PRESSURE: 155 MMHG | WEIGHT: 203.93 LBS

## 2019-10-29 PROCEDURE — 93018 CV STRESS TEST I&R ONLY: CPT | Performed by: INTERNAL MEDICINE

## 2019-10-29 PROCEDURE — 25010000002 PERFLUTREN 6.52 MG/ML SUSPENSION: Performed by: INTERNAL MEDICINE

## 2019-10-29 PROCEDURE — 93352 ADMIN ECG CONTRAST AGENT: CPT | Performed by: INTERNAL MEDICINE

## 2019-10-29 PROCEDURE — 93350 STRESS TTE ONLY: CPT | Performed by: INTERNAL MEDICINE

## 2019-10-29 PROCEDURE — 93350 STRESS TTE ONLY: CPT

## 2019-10-29 PROCEDURE — 93017 CV STRESS TEST TRACING ONLY: CPT

## 2019-10-29 RX ADMIN — PERFLUTREN 8.48 MG: 6.52 INJECTION, SUSPENSION INTRAVENOUS at 08:44

## 2019-10-30 LAB
BH CV STRESS BP STAGE 1: NORMAL
BH CV STRESS BP STAGE 2: NORMAL
BH CV STRESS DURATION MIN STAGE 1: 3
BH CV STRESS DURATION MIN STAGE 2: 3
BH CV STRESS DURATION MIN STAGE 3: 1
BH CV STRESS DURATION SEC STAGE 1: 0
BH CV STRESS DURATION SEC STAGE 2: 0
BH CV STRESS DURATION SEC STAGE 3: 25
BH CV STRESS GRADE STAGE 1: 10
BH CV STRESS GRADE STAGE 2: 12
BH CV STRESS GRADE STAGE 3: 14
BH CV STRESS HR STAGE 1: 96
BH CV STRESS HR STAGE 2: 129
BH CV STRESS HR STAGE 3: 148
BH CV STRESS METS STAGE 1: 5
BH CV STRESS METS STAGE 2: 7.5
BH CV STRESS METS STAGE 3: 10
BH CV STRESS PROTOCOL 1: NORMAL
BH CV STRESS RECOVERY BP: NORMAL MMHG
BH CV STRESS RECOVERY HR: 72 BPM
BH CV STRESS SPEED STAGE 1: 1.7
BH CV STRESS SPEED STAGE 2: 2.5
BH CV STRESS SPEED STAGE 3: 3.4
BH CV STRESS STAGE 1: 1
BH CV STRESS STAGE 2: 2
BH CV STRESS STAGE 3: 3
MAXIMAL PREDICTED HEART RATE: 162 BPM
PERCENT MAX PREDICTED HR: 91.36 %
STRESS BASELINE BP: NORMAL MMHG
STRESS BASELINE HR: 56 BPM
STRESS PERCENT HR: 107 %
STRESS POST ESTIMATED WORKLOAD: 10 METS
STRESS POST EXERCISE DUR MIN: 7 MIN
STRESS POST EXERCISE DUR SEC: 25 SEC
STRESS POST PEAK BP: NORMAL MMHG
STRESS POST PEAK HR: 148 BPM
STRESS TARGET HR: 138 BPM

## 2021-06-14 PROCEDURE — 87635 SARS-COV-2 COVID-19 AMP PRB: CPT | Performed by: NURSE PRACTITIONER

## 2022-05-03 ENCOUNTER — TELEPHONE (OUTPATIENT)
Dept: GASTROENTEROLOGY | Facility: CLINIC | Age: 62
End: 2022-05-03

## 2022-05-03 ENCOUNTER — PREP FOR SURGERY (OUTPATIENT)
Dept: OTHER | Facility: HOSPITAL | Age: 62
End: 2022-05-03

## 2022-05-03 DIAGNOSIS — Z80.0 FAMILY HISTORY OF COLON CANCER IN FATHER: ICD-10-CM

## 2022-05-03 DIAGNOSIS — Z12.11 ENCOUNTER FOR SCREENING COLONOSCOPY: Primary | ICD-10-CM

## 2022-05-03 NOTE — TELEPHONE ENCOUNTER
PT returned my call.  PT isn't having any issues, no blood thinners.    PT is scheduled for 5-19-22 for colon.    Prep?

## 2022-05-04 PROBLEM — Z12.11 ENCOUNTER FOR SCREENING COLONOSCOPY: Status: ACTIVE | Noted: 2022-05-04

## 2022-05-04 PROBLEM — Z80.0 FAMILY HISTORY OF COLON CANCER IN FATHER: Status: ACTIVE | Noted: 2022-05-04

## 2022-05-19 ENCOUNTER — ANESTHESIA (OUTPATIENT)
Dept: GASTROENTEROLOGY | Facility: HOSPITAL | Age: 62
End: 2022-05-19

## 2022-05-19 ENCOUNTER — HOSPITAL ENCOUNTER (OUTPATIENT)
Facility: HOSPITAL | Age: 62
Setting detail: HOSPITAL OUTPATIENT SURGERY
Discharge: HOME | End: 2022-05-19
Attending: INTERNAL MEDICINE | Admitting: INTERNAL MEDICINE

## 2022-05-19 ENCOUNTER — ANESTHESIA EVENT (OUTPATIENT)
Dept: GASTROENTEROLOGY | Facility: HOSPITAL | Age: 62
End: 2022-05-19

## 2022-05-19 ENCOUNTER — TELEPHONE (OUTPATIENT)
Dept: GASTROENTEROLOGY | Facility: CLINIC | Age: 62
End: 2022-05-19

## 2022-05-19 VITALS
OXYGEN SATURATION: 94 % | HEIGHT: 71 IN | RESPIRATION RATE: 18 BRPM | BODY MASS INDEX: 27.72 KG/M2 | HEART RATE: 69 BPM | DIASTOLIC BLOOD PRESSURE: 86 MMHG | SYSTOLIC BLOOD PRESSURE: 119 MMHG | WEIGHT: 198 LBS | TEMPERATURE: 97.3 F

## 2022-05-19 DIAGNOSIS — Z12.11 ENCOUNTER FOR SCREENING COLONOSCOPY: ICD-10-CM

## 2022-05-19 DIAGNOSIS — Z80.0 FAMILY HISTORY OF COLON CANCER IN FATHER: ICD-10-CM

## 2022-05-19 PROCEDURE — 45378 DIAGNOSTIC COLONOSCOPY: CPT | Performed by: INTERNAL MEDICINE

## 2022-05-19 PROCEDURE — 25010000002 PROPOFOL 10 MG/ML EMULSION: Performed by: NURSE ANESTHETIST, CERTIFIED REGISTERED

## 2022-05-19 RX ORDER — SODIUM CHLORIDE 0.9 % (FLUSH) 0.9 %
10 SYRINGE (ML) INJECTION AS NEEDED
Status: DISCONTINUED | OUTPATIENT
Start: 2022-05-19 | End: 2022-05-19 | Stop reason: HOSPADM

## 2022-05-19 RX ORDER — ONDANSETRON 2 MG/ML
4 INJECTION INTRAMUSCULAR; INTRAVENOUS ONCE AS NEEDED
Status: DISCONTINUED | OUTPATIENT
Start: 2022-05-19 | End: 2022-05-19 | Stop reason: HOSPADM

## 2022-05-19 RX ORDER — PROPOFOL 10 MG/ML
VIAL (ML) INTRAVENOUS AS NEEDED
Status: DISCONTINUED | OUTPATIENT
Start: 2022-05-19 | End: 2022-05-19 | Stop reason: SURG

## 2022-05-19 RX ORDER — LIDOCAINE HYDROCHLORIDE 20 MG/ML
INJECTION, SOLUTION EPIDURAL; INFILTRATION; INTRACAUDAL; PERINEURAL AS NEEDED
Status: DISCONTINUED | OUTPATIENT
Start: 2022-05-19 | End: 2022-05-19 | Stop reason: SURG

## 2022-05-19 RX ORDER — LIDOCAINE HYDROCHLORIDE 10 MG/ML
0.5 INJECTION, SOLUTION EPIDURAL; INFILTRATION; INTRACAUDAL; PERINEURAL ONCE AS NEEDED
Status: DISCONTINUED | OUTPATIENT
Start: 2022-05-19 | End: 2022-05-19 | Stop reason: HOSPADM

## 2022-05-19 RX ORDER — SODIUM CHLORIDE 9 MG/ML
500 INJECTION, SOLUTION INTRAVENOUS CONTINUOUS PRN
Status: DISCONTINUED | OUTPATIENT
Start: 2022-05-19 | End: 2022-05-19 | Stop reason: HOSPADM

## 2022-05-19 RX ADMIN — PROPOFOL 100 MG: 10 INJECTION, EMULSION INTRAVENOUS at 10:26

## 2022-05-19 RX ADMIN — PROPOFOL 100 MG: 10 INJECTION, EMULSION INTRAVENOUS at 10:30

## 2022-05-19 RX ADMIN — SODIUM CHLORIDE 500 ML: 9 INJECTION, SOLUTION INTRAVENOUS at 09:39

## 2022-05-19 RX ADMIN — LIDOCAINE HYDROCHLORIDE 50 MG: 20 INJECTION, SOLUTION EPIDURAL; INFILTRATION; INTRACAUDAL; PERINEURAL at 10:26

## 2022-05-19 RX ADMIN — PROPOFOL 50 MG: 10 INJECTION, EMULSION INTRAVENOUS at 10:34

## 2022-05-19 NOTE — ANESTHESIA PREPROCEDURE EVALUATION
Anesthesia Evaluation     Patient summary reviewed and Nursing notes reviewed   no history of anesthetic complications:  NPO Solid Status: > 8 hours  NPO Liquid Status: > 2 hours           Airway   Mallampati: I  TM distance: >3 FB  Neck ROM: full  No difficulty expected  Dental      Pulmonary    (-) not a smoker  Cardiovascular   Exercise tolerance: good (4-7 METS)    (-) hypertension, CAD      Neuro/Psych  (+) seizures (medication induced, once, years ago),    GI/Hepatic/Renal/Endo    (+)  GERD,  hepatitis (cleared virus with treatment) C, renal disease stones,     Musculoskeletal     Abdominal    Substance History      OB/GYN          Other   arthritis,                      Anesthesia Plan    ASA 2     MAC     intravenous induction     Anesthetic plan, all risks, benefits, and alternatives have been provided, discussed and informed consent has been obtained with: patient.        CODE STATUS:

## 2022-05-19 NOTE — H&P
[facility] Gastroenterology  Pre Procedure History & Physical    Chief Complaint:   Screening    Subjective     HPI:   Screening.  He is asymptomatic.  He presents for screening colonoscopy.  He originally thought his last colonoscopy was in 2016 in [place] but he states his family physician had records indicating his 2011.  He also had a colonoscopy in 2004.  He denies a family history of colon cancer    Previously had heard his father had colon cancer he states his father did not.  No family members with colon cancer.    Past Medical History:   Past Medical History:   Diagnosis Date   • Arthritis    • Elevated liver enzymes    • GERD (gastroesophageal reflux disease)    • Gout    • Infectious viral hepatitis     C   • Renal stones    • Seizures (HCC)    • Venous stasis        Past Surgical History:  Past Surgical History:   Procedure Laterality Date   • CHOLECYSTECTOMY     • COLONOSCOPY  07/26/2004    normal   • FOREARM SURGERY     • IMPLANTABLE CONTACT LENS IMPLANTATION     • OTHER SURGICAL HISTORY      left scapula repair   • SHOULDER SURGERY     • VASECTOMY         Family History:  Family History   Problem Relation Age of Onset   • Colon cancer Neg Hx    • Colon polyps Neg Hx    • Heart attack Neg Hx    • Heart disease Neg Hx    • Heart failure Neg Hx        Social History:   reports that he has never smoked. He has never used smokeless tobacco. He reports that he does not drink alcohol and does not use drugs.    Medications:   Prior to Admission medications    Medication Sig Start Date End Date Taking? Authorizing Provider   ibuprofen (ADVIL,MOTRIN) 200 MG tablet Take 200 mg by mouth Every 6 (Six) Hours As Needed for Mild Pain .   Yes ProviderRose MD   clotrimazole-betamethasone (LOTRISONE) 1-0.05 % cream Apply  topically to the appropriate area as directed As Needed.    ProviderRose MD       Allergies:  Levaquin [levofloxacin]    ROS:    General: Weight stable  Resp: No  "SOA  Cardiovascular: No CP    Objective     Blood pressure 104/73, pulse 80, temperature 97.3 °F (36.3 °C), temperature source Temporal, resp. rate 18, height 180.3 cm (71\"), weight 89.8 kg (198 lb), SpO2 97 %.    Physical Exam   Constitutional: Pt is oriented to person, place, and in no distress.   Cardiovascular: Normal rate, regular rhythm.    Pulmonary/Chest: Effort normal. No respiratory distress.   Abdominal: Non-distended.  Psychiatric: Mood, memory, affect and judgment appear normal.     Assessment & Plan     Diagnosis:  Screening    Anticipated Surgical Procedure:  Colonoscopy    The risks, benefits, and alternatives of this procedure have been discussed with the patient or the responsible party- the patient understands and agrees to proceed.    EMR Dragon/transcription disclaimer:  Much of this encounter note is electronic transcription/translation of spoken language to printed text.  The electronic translation of spoken language may be erroneous, or at times, nonsensical words or phrases may be inadvertently transcribed.  Although I have reviewed the note for such errors, some may still exist.  "

## 2022-05-19 NOTE — ANESTHESIA POSTPROCEDURE EVALUATION
"Patient: Conor Elizondo    Procedure Summary     Date: 05/19/22 Room / Location: [facility] ENDOSCOPY 4 / [facility] ENDOSCOPY    Anesthesia Start: 1021 Anesthesia Stop: 1046    Procedure: COLONOSCOPY (N/A ) Diagnosis:       Encounter for screening colonoscopy      Family history of colon cancer in father      (Encounter for screening colonoscopy [Z12.11])      (Family history of colon cancer in father [Z80.0])    Surgeons: Pete Neal MD Provider: Ender Jackson CRNA    Anesthesia Type: MAC ASA Status: 2          Anesthesia Type: MAC    Vitals  Vitals Value Taken Time   /86 05/19/22 1106   Temp     Pulse 73 05/19/22 1106   Resp 18 05/19/22 1105   SpO2 95 % 05/19/22 1106   Vitals shown include unvalidated device data.        Post Anesthesia Care and Evaluation    Patient location during evaluation: PHASE II  Patient participation: complete - patient participated  Level of consciousness: awake and alert  Pain management: adequate  Airway patency: patent  Anesthetic complications: No anesthetic complications    Cardiovascular status: acceptable  Respiratory status: acceptable  Hydration status: acceptable    Comments: Blood pressure 119/86, pulse 69, temperature 97.3 °F (36.3 °C), temperature source Temporal, resp. rate 18, height 180.3 cm (71\"), weight 89.8 kg (198 lb), SpO2 94 %.    Pt discharged from PACU based on nicko score >8      "

## 2022-11-10 ENCOUNTER — HOSPITAL ENCOUNTER (EMERGENCY)
Facility: HOSPITAL | Age: 62
Discharge: HOME | End: 2022-11-10
Attending: STUDENT IN AN ORGANIZED HEALTH CARE EDUCATION/TRAINING PROGRAM | Admitting: STUDENT IN AN ORGANIZED HEALTH CARE EDUCATION/TRAINING PROGRAM

## 2022-11-10 VITALS
SYSTOLIC BLOOD PRESSURE: 156 MMHG | HEIGHT: 71 IN | WEIGHT: 205 LBS | DIASTOLIC BLOOD PRESSURE: 100 MMHG | HEART RATE: 63 BPM | OXYGEN SATURATION: 95 % | BODY MASS INDEX: 28.7 KG/M2 | RESPIRATION RATE: 18 BRPM | TEMPERATURE: 98.5 F

## 2022-11-10 DIAGNOSIS — L02.413 ABSCESS OF SKIN OF RIGHT WRIST: Primary | ICD-10-CM

## 2022-11-10 DIAGNOSIS — L03.113 CELLULITIS OF RIGHT WRIST: ICD-10-CM

## 2022-11-10 PROCEDURE — 99283 EMERGENCY DEPT VISIT LOW MDM: CPT

## 2022-11-10 RX ORDER — LIDOCAINE HYDROCHLORIDE AND EPINEPHRINE BITARTRATE 20; .01 MG/ML; MG/ML
10 INJECTION, SOLUTION SUBCUTANEOUS ONCE
Status: COMPLETED | OUTPATIENT
Start: 2022-11-10 | End: 2022-11-10

## 2022-11-10 RX ORDER — CEPHALEXIN 500 MG/1
500 CAPSULE ORAL ONCE
Status: COMPLETED | OUTPATIENT
Start: 2022-11-10 | End: 2022-11-10

## 2022-11-10 RX ORDER — ACETAMINOPHEN 500 MG
1000 TABLET ORAL ONCE
Status: COMPLETED | OUTPATIENT
Start: 2022-11-10 | End: 2022-11-10

## 2022-11-10 RX ORDER — IBUPROFEN 400 MG/1
400 TABLET ORAL ONCE
Status: COMPLETED | OUTPATIENT
Start: 2022-11-10 | End: 2022-11-10

## 2022-11-10 RX ORDER — CEPHALEXIN 500 MG/1
500 CAPSULE ORAL 3 TIMES DAILY
Qty: 29 CAPSULE | Refills: 0 | Status: SHIPPED | OUTPATIENT
Start: 2022-11-10 | End: 2022-11-20

## 2022-11-10 RX ORDER — SULFAMETHOXAZOLE AND TRIMETHOPRIM 800; 160 MG/1; MG/1
1 TABLET ORAL 2 TIMES DAILY
Qty: 19 TABLET | Refills: 0 | Status: SHIPPED | OUTPATIENT
Start: 2022-11-10 | End: 2022-11-20

## 2022-11-10 RX ORDER — SULFAMETHOXAZOLE AND TRIMETHOPRIM 800; 160 MG/1; MG/1
1 TABLET ORAL ONCE
Status: COMPLETED | OUTPATIENT
Start: 2022-11-10 | End: 2022-11-10

## 2022-11-10 RX ADMIN — IBUPROFEN 400 MG: 400 TABLET, FILM COATED ORAL at 21:12

## 2022-11-10 RX ADMIN — LIDOCAINE HYDROCHLORIDE,EPINEPHRINE BITARTRATE 10 ML: 20; .01 INJECTION, SOLUTION INFILTRATION; PERINEURAL at 22:16

## 2022-11-10 RX ADMIN — SULFAMETHOXAZOLE AND TRIMETHOPRIM 1 TABLET: 800; 160 TABLET ORAL at 22:36

## 2022-11-10 RX ADMIN — CEPHALEXIN 500 MG: 500 CAPSULE ORAL at 22:36

## 2022-11-10 RX ADMIN — ACETAMINOPHEN 1000 MG: 500 TABLET ORAL at 21:12

## 2022-11-11 NOTE — ED PROCEDURE NOTE
Place of Service:[facility] EMERGENCY DEPARTMENT  Patient Name:Conor Elizondo  :1960    Procedure  Incision & Drainage    Date/Time: 11/10/2022 10:27 PM  Performed by: Ender Abreu MD  Authorized by: Ender Abreu MD     Consent:     Consent obtained:  Verbal    Consent given by:  Patient    Risks discussed:  Damage to other organs, bleeding, incomplete drainage, infection and pain    Alternatives discussed:  Referral  Universal protocol:     Imaging studies available: yes (bedside US)      Patient identity confirmed:  Verbally with patient  Location:     Type:  Abscess    Size:  2cm x 2cm    Location:  Upper extremity    Upper extremity location:  Wrist    Wrist location:  R wrist  Pre-procedure details:     Skin preparation:  Chlorhexidine  Anesthesia:     Anesthesia method:  Local infiltration    Local anesthetic:  Lidocaine 1% WITH epi  Procedure type:     Complexity:  Simple  Procedure details:     Ultrasound guidance: yes      Incision types:  Stab incision    Incision depth:  Subcutaneous    Wound management:  Probed and deloculated and irrigated with saline    Drainage:  Purulent and bloody    Drainage amount:  Scant    Wound treatment:  Wound left open    Packing materials:  None  Post-procedure details:     Procedure completion:  Tolerated well, no immediate complications              Ender Abreu MD  11/10/22 1460

## 2022-11-11 NOTE — ED PROVIDER NOTES
EMERGENCY DEPARTMENT HISTORY AND PHYSICAL EXAM    Patient Name: Conor Elizondo    Chief Complaint   Patient presents with   • Abscess       History of Presenting Illness:  Conor Elizondo is a 61 y.o. male with no significant past medical who presents the emergency department due to wrist abscess.    Patient states that he thought he had an ingrown hair on his right wrist so he put a needle into his skin.  States it is progressively gotten worse over the last week interval which is why he presents emergency department.  Endorses some purulent drainage from the area.  Denies any fevers.  No history of diabetes.  No inciting trauma that he can recall.      Past Medical History:   Past Medical History:   Diagnosis Date   • Arthritis    • Elevated liver enzymes    • GERD (gastroesophageal reflux disease)    • Gout    • Infectious viral hepatitis     C   • Renal stones    • Seizures (HCC)    • Venous stasis        Past Surgical History:   Past Surgical History:   Procedure Laterality Date   • CHOLECYSTECTOMY     • COLONOSCOPY  07/26/2004    normal   • COLONOSCOPY N/A 5/19/2022    Procedure: COLONOSCOPY;  Surgeon: Pete Neal MD;  Location: [facility] ENDOSCOPY;  Service: Gastroenterology;  Laterality: N/A;  pre screen, family hx colon ca  post hemorrhoids  Lux Leon MD   • FOREARM SURGERY     • IMPLANTABLE CONTACT LENS IMPLANTATION     • OTHER SURGICAL HISTORY      left scapula repair   • SHOULDER SURGERY     • VASECTOMY         Social History:   Denies tobacco  Denies EtOH  Denies marijuana, cocaine, or IV drugs    Allergies:   Allergies   Allergen Reactions   • Levaquin [Levofloxacin] Other (See Comments)     seizures       Medications:   No current facility-administered medications for this encounter.    Current Outpatient Medications:   •  cephalexin (KEFLEX) 500 MG capsule, Take 1 capsule by mouth 3 (Three) Times a Day for 10 days., Disp: 29 capsule, Rfl: 0  •  clotrimazole-betamethasone (LOTRISONE)  "1-0.05 % cream, Apply  topically to the appropriate area as directed As Needed., Disp: , Rfl:   •  ibuprofen (ADVIL,MOTRIN) 200 MG tablet, Take 200 mg by mouth Every 6 (Six) Hours As Needed for Mild Pain ., Disp: , Rfl:   •  sulfamethoxazole-trimethoprim (BACTRIM DS,SEPTRA DS) 800-160 MG per tablet, Take 1 tablet by mouth 2 (Two) Times a Day for 10 days., Disp: 19 tablet, Rfl: 0    Review of Systems:  A full review of systems was obtained and is negative unless otherwise stated in HPI.    Physical Exam:   VS: /100   Pulse 63   Temp 98.5 °F (36.9 °C) (Oral)   Resp 18   Ht 180.3 cm (71\")   Wt 93 kg (205 lb)   SpO2 95%   BMI 28.59 kg/m²   GENERAL: Well-appearing management sitting up in chair no acute distress; well nourished, well developed, awake, alert, no acute distress, nontoxic appearing, comfortable  MUSCULOSKELETAL/EXTREMITIES: Tenderness in patient's wound but no tenderness over the joint of the wrist no effusion of the wrist; no distal tenderness of the hand or proximal elbow; extremities without obvious deformity, no cyanosis or clubbing  SKIN: Purulent abscess of the right wrist with some drainage with surrounding erythema seen in the photo below; otherwise warm and dry with no obvious rashes    NEUROLOGIC: moving all 4 extremities symmetrically, CN II-XII grossly intact  PSYCHIATRIC: alert, pleasant and cooperative. Appropriate mood and affect.        Procedures: incision & drainage      Medical Decision Making:  Conor Elizondo is a 61 y.o. male who presents emergency room due to wrist swelling in the setting of a prior lesion concerns for ingrown hair.    Afebrile on presentation reassuring vital signs.    Point-of-care ultrasound notable for his small subcutaneous abscess.    Incision and drainage was performed under ultrasound guidance with removal of purulent bloody fluid fluid the deloculation was performed.      Patient's presentation is most consistent with subcutaneous abscess.  Small " surrounding cellulitis.  No evidence of any joint involvement on my exam at current.  No systemic symptoms.    Patient was given Tylenol and ibuprofen for pain as well as 1 dose of Keflex and Bactrim in the emergency department.    Patient was discharged home with scription for Bactrim and Keflex to take for 10 days with plan to follow-up with her primary care provider within 2 days for further management.  Given return precautions emerged department for worsening symptoms.      ED Diagnosis:  Abscess of skin of right wrist; Cellulitis of right wrist      Disposition: to home    Follow up plan: PCP follow up within 2 days, return to ED immediately if symptoms worsen        Signed:  Ender Abreu MD  Emergency Medicine Physician    Please note that portions of this note were completed with a voice recognition program.      Ender Abreu MD  11/11/22 0257

## (undated) DEVICE — THE CHANNEL CLEANING BRUSH IS A NYLON FLEXI BRUSH ATTACHED TO A FLEXIBLE PLASTIC SHEATH DESIGNED TO SAFELY REMOVE DEBRIS FROM FLEXIBLE ENDOSCOPES.

## (undated) DEVICE — YANKAUER,BULB TIP WITH VENT: Brand: ARGYLE

## (undated) DEVICE — MASK,OXYGEN,MED CONC,ADLT,7' TUB, UC: Brand: PENDING

## (undated) DEVICE — SENSR O2 OXIMAX FNGR A/ 18IN NONSTR

## (undated) DEVICE — CUFF,BP,DISP,1 TUBE,ADULT,HP: Brand: MEDLINE

## (undated) DEVICE — TBG SMPL FLTR LINE NASL 02/C02 A/ BX/100

## (undated) DEVICE — Device: Brand: DEFENDO AIR/WATER/SUCTION AND BIOPSY VALVE